# Patient Record
Sex: FEMALE | Race: OTHER | Employment: UNEMPLOYED | URBAN - METROPOLITAN AREA
[De-identification: names, ages, dates, MRNs, and addresses within clinical notes are randomized per-mention and may not be internally consistent; named-entity substitution may affect disease eponyms.]

---

## 2021-08-02 ENCOUNTER — APPOINTMENT (OUTPATIENT)
Dept: GENERAL RADIOLOGY | Age: 51
End: 2021-08-02
Attending: STUDENT IN AN ORGANIZED HEALTH CARE EDUCATION/TRAINING PROGRAM
Payer: COMMERCIAL

## 2021-08-02 ENCOUNTER — HOSPITAL ENCOUNTER (EMERGENCY)
Age: 51
Discharge: HOME | End: 2021-08-02
Attending: STUDENT IN AN ORGANIZED HEALTH CARE EDUCATION/TRAINING PROGRAM
Payer: COMMERCIAL

## 2021-08-02 VITALS
RESPIRATION RATE: 18 BRPM | OXYGEN SATURATION: 97 % | WEIGHT: 158.51 LBS | DIASTOLIC BLOOD PRESSURE: 83 MMHG | BODY MASS INDEX: 28.09 KG/M2 | HEART RATE: 78 BPM | SYSTOLIC BLOOD PRESSURE: 122 MMHG | HEIGHT: 63 IN | TEMPERATURE: 98.6 F

## 2021-08-02 DIAGNOSIS — R07.89 OTHER CHEST PAIN: Primary | ICD-10-CM

## 2021-08-02 LAB
ALBUMIN SERPL-MCNC: 4.3 G/DL (ref 3.5–5)
ALBUMIN/GLOB SERPL: 1.2 {RATIO} (ref 1.1–2.2)
ALP SERPL-CCNC: 86 U/L (ref 45–117)
ALT SERPL-CCNC: 38 U/L (ref 12–78)
ANION GAP SERPL CALC-SCNC: 9 MMOL/L (ref 5–15)
AST SERPL-CCNC: 16 U/L (ref 15–37)
BASOPHILS # BLD: 0 K/UL (ref 0–0.1)
BASOPHILS NFR BLD: 0 % (ref 0–1)
BILIRUB SERPL-MCNC: 0.4 MG/DL (ref 0.2–1)
BUN SERPL-MCNC: 12 MG/DL (ref 6–20)
BUN/CREAT SERPL: 19 (ref 12–20)
CALCIUM SERPL-MCNC: 8.8 MG/DL (ref 8.5–10.1)
CHLORIDE SERPL-SCNC: 102 MMOL/L (ref 97–108)
CO2 SERPL-SCNC: 28 MMOL/L (ref 21–32)
COMMENT, HOLDF: NORMAL
CREAT SERPL-MCNC: 0.62 MG/DL (ref 0.55–1.02)
DIFFERENTIAL METHOD BLD: NORMAL
EOSINOPHIL # BLD: 0.1 K/UL (ref 0–0.4)
EOSINOPHIL NFR BLD: 2 % (ref 0–7)
ERYTHROCYTE [DISTWIDTH] IN BLOOD BY AUTOMATED COUNT: 12 % (ref 11.5–14.5)
GLOBULIN SER CALC-MCNC: 3.5 G/DL (ref 2–4)
GLUCOSE SERPL-MCNC: 102 MG/DL (ref 65–100)
HCT VFR BLD AUTO: 40.4 % (ref 35–47)
HGB BLD-MCNC: 13.9 G/DL (ref 11.5–16)
IMM GRANULOCYTES # BLD AUTO: 0 K/UL (ref 0–0.04)
IMM GRANULOCYTES NFR BLD AUTO: 0 % (ref 0–0.5)
LYMPHOCYTES # BLD: 2.1 K/UL (ref 0.8–3.5)
LYMPHOCYTES NFR BLD: 46 % (ref 12–49)
MCH RBC QN AUTO: 32.1 PG (ref 26–34)
MCHC RBC AUTO-ENTMCNC: 34.4 G/DL (ref 30–36.5)
MCV RBC AUTO: 93.3 FL (ref 80–99)
MONOCYTES # BLD: 0.4 K/UL (ref 0–1)
MONOCYTES NFR BLD: 8 % (ref 5–13)
NEUTS SEG # BLD: 2.1 K/UL (ref 1.8–8)
NEUTS SEG NFR BLD: 45 % (ref 32–75)
NRBC # BLD: 0 K/UL (ref 0–0.01)
NRBC BLD-RTO: 0 PER 100 WBC
PLATELET # BLD AUTO: 251 K/UL (ref 150–400)
PMV BLD AUTO: 10.3 FL (ref 8.9–12.9)
POTASSIUM SERPL-SCNC: 3.8 MMOL/L (ref 3.5–5.1)
PROT SERPL-MCNC: 7.8 G/DL (ref 6.4–8.2)
RBC # BLD AUTO: 4.33 M/UL (ref 3.8–5.2)
SAMPLES BEING HELD,HOLD: NORMAL
SODIUM SERPL-SCNC: 139 MMOL/L (ref 136–145)
TROPONIN I SERPL-MCNC: <0.05 NG/ML
TROPONIN I SERPL-MCNC: <0.05 NG/ML
WBC # BLD AUTO: 4.6 K/UL (ref 3.6–11)

## 2021-08-02 PROCEDURE — 74011250637 HC RX REV CODE- 250/637: Performed by: STUDENT IN AN ORGANIZED HEALTH CARE EDUCATION/TRAINING PROGRAM

## 2021-08-02 PROCEDURE — 99284 EMERGENCY DEPT VISIT MOD MDM: CPT

## 2021-08-02 PROCEDURE — 71046 X-RAY EXAM CHEST 2 VIEWS: CPT

## 2021-08-02 PROCEDURE — 93005 ELECTROCARDIOGRAM TRACING: CPT

## 2021-08-02 PROCEDURE — 36415 COLL VENOUS BLD VENIPUNCTURE: CPT

## 2021-08-02 PROCEDURE — 84484 ASSAY OF TROPONIN QUANT: CPT

## 2021-08-02 PROCEDURE — 80053 COMPREHEN METABOLIC PANEL: CPT

## 2021-08-02 PROCEDURE — 85025 COMPLETE CBC W/AUTO DIFF WBC: CPT

## 2021-08-02 RX ORDER — GUAIFENESIN 100 MG/5ML
324 LIQUID (ML) ORAL
Status: COMPLETED | OUTPATIENT
Start: 2021-08-02 | End: 2021-08-02

## 2021-08-02 RX ORDER — ERGOCALCIFEROL 1.25 MG/1
50000 CAPSULE ORAL
COMMUNITY

## 2021-08-02 RX ORDER — ALBUTEROL SULFATE 90 UG/1
2 AEROSOL, METERED RESPIRATORY (INHALATION)
COMMUNITY

## 2021-08-02 RX ADMIN — ASPIRIN 324 MG: 81 TABLET, CHEWABLE ORAL at 12:56

## 2021-08-02 NOTE — ED TRIAGE NOTES
Pt ambulatory to treatment room, reports chest pain that began about a year ago, then a few weeks ago was woken from sleep, and then today around 11am while she was working out. Pt reports feeling like there is a \"big ball in the center of my chest that radiates to the back. \" Pt has an appt with PCP next week.

## 2021-08-02 NOTE — ED NOTES
Pt resting on stretcher in no obvious distress. Blood draw sent to lab. Awaiting results.  Call bell in reach

## 2021-08-02 NOTE — DISCHARGE INSTRUCTIONS
You presented to the ED with chest pain, full ACS work-up was completed. Your troponins were both within normal limits. You are deemed to have low risk for acute coronary syndrome. However as you have had pain at rest and pain with exertion please follow-up with your primary care doctor as well as cardiology.

## 2021-08-02 NOTE — ED PROVIDER NOTES
Patient is a 64-year-old female presenting to the ED with chest pain after exercise with history of couple previous episodes of chest pain. The history is provided by the patient and the spouse. Chest Pain   This is a recurrent problem. The current episode started 3 to 5 hours ago. The problem has been gradually improving. The problem occurs constantly. The pain is associated with exertion. The pain is present in the substernal region. The pain is mild. The quality of the pain is described as sharp. The pain does not radiate. Pertinent negatives include no diaphoresis, no irregular heartbeat, no lower extremity edema, no nausea, no near-syncope, no palpitations and no shortness of breath. She has tried rest for the symptoms. The treatment provided moderate relief. No risk factors for CADHer past medical history does not include DVT, HTN or CHF. Past Medical History:   Diagnosis Date    Asthma     Mitral valve prolapse        Past Surgical History:   Procedure Laterality Date    HX GYN Left     ovary removal    HX GYN          HX GYN      partial hysterectomy, pt has cervix and one ovary         History reviewed. No pertinent family history.     Social History     Socioeconomic History    Marital status:      Spouse name: Not on file    Number of children: Not on file    Years of education: Not on file    Highest education level: Not on file   Occupational History    Not on file   Tobacco Use    Smoking status: Never Smoker    Smokeless tobacco: Never Used   Substance and Sexual Activity    Alcohol use: Yes     Comment: several drinks per day    Drug use: Never    Sexual activity: Not on file   Other Topics Concern    Not on file   Social History Narrative    Not on file     Social Determinants of Health     Financial Resource Strain:     Difficulty of Paying Living Expenses:    Food Insecurity:     Worried About Running Out of Food in the Last Year:     [address] in the Last Year:    Transportation Needs:     Lack of Transportation (Medical):  Lack of Transportation (Non-Medical):    Physical Activity:     Days of Exercise per Week:     Minutes of Exercise per Session:    Stress:     Feeling of Stress :    Social Connections:     Frequency of Communication with Friends and Family:     Frequency of Social Gatherings with Friends and Family:     Attends [employer] Services:     Active Member of Clubs or Organizations:     Attends Club or Organization Meetings:     Marital Status:    Intimate Partner Violence:     Fear of Current or Ex-Partner:     Emotionally Abused:     Physically Abused:     Sexually Abused: ALLERGIES: Ampicillin    Review of Systems   Constitutional: Negative. Negative for diaphoresis. HENT: Negative. Eyes: Negative. Respiratory: Negative. Negative for shortness of breath. Cardiovascular: Positive for chest pain. Negative for palpitations and near-syncope. Gastrointestinal: Negative. Negative for nausea. Endocrine: Negative. Genitourinary: Negative. Musculoskeletal: Negative. Skin: Negative. Allergic/Immunologic: Negative. Neurological: Negative. Hematological: Negative. Psychiatric/Behavioral: Negative. Vitals:    08/02/21 1400 08/02/21 1430 08/02/21 1500 08/02/21 1530   BP: 127/83 128/83 125/78 122/83   Pulse: 74 75 91 78   Resp: 15 19 17 18   Temp:       SpO2: 99% 99% 98% 97%   Weight:       Height:                Physical Exam  Vitals and nursing note reviewed. Constitutional:       General: She is not in acute distress. HENT:      Head: Normocephalic and atraumatic. Right Ear: External ear normal.      Left Ear: External ear normal.      Nose: Nose normal.      Mouth/Throat:      Mouth: Mucous membranes are moist.      Pharynx: Oropharynx is clear. No posterior oropharyngeal erythema. Eyes:      Extraocular Movements: Extraocular movements intact.       Conjunctiva/sclera: Conjunctivae normal.   Cardiovascular:      Rate and Rhythm: Normal rate. Pulses: Normal pulses. Heart sounds: Normal heart sounds. Pulmonary:      Effort: Pulmonary effort is normal.      Breath sounds: Normal breath sounds. Chest:      Chest wall: No deformity. Abdominal:      General: Abdomen is flat. Tenderness: There is no abdominal tenderness. Musculoskeletal:         General: Normal range of motion. Cervical back: Normal range of motion. Skin:     General: Skin is warm and dry. Capillary Refill: Capillary refill takes less than 2 seconds. Neurological:      General: No focal deficit present. Mental Status: She is alert and oriented to person, place, and time. Psychiatric:         Mood and Affect: Mood normal.         Behavior: Behavior normal.          MDM       LABORATORY RESULTS:  Labs Reviewed   METABOLIC PANEL, COMPREHENSIVE - Abnormal; Notable for the following components:       Result Value    Glucose 102 (*)     All other components within normal limits   SAMPLES BEING HELD   CBC WITH AUTOMATED DIFF   TROPONIN I   TROPONIN I       IMAGING RESULTS:  XR CHEST PA LAT   Final Result   Normal chest.             MEDICATIONS GIVEN:  Medications   aspirin chewable tablet 324 mg (324 mg Oral Given 8/2/21 1256)       Differential diagnosis: ACS, noncardiac chest pain, anxiety, asthma    ED physician interpretation of imaging: Chest x-ray without hemopneumothorax, focal pneumonia. ED physician interpretation of EKG: No STEMI. Rhythm: normal sinus rhythm; and regular . Rate (approx.): 89; EKG documented and interpreted by Padmini Valles MD  ED physician interpretation of laboratory results: Patient's lab work, specifically delta troponin both unmeasurable.     MDM: Patient is a 26-year-old female with history of asthma who has had a few previous episodes of chest pain today chest pain returned with exercise patient found to have unremarkable ACS work-up, doubt ACS at this time, no other signs of significant pathology requiring further emergent medical intervention, patient appropriate for discharge home and close outpatient follow-up with both her primary care doctor and cardiology. Key discharge instructions: You presented to the ED with chest pain, full ACS work-up was completed. Your troponins were both within normal limits. You are deemed to have low risk for acute coronary syndrome. However as you have had pain at rest and pain with exertion please follow-up with your primary care doctor as well as cardiology. The patient has been re-evaluated and feeling much better and are stable for discharge. All available radiology and laboratory results have been reviewed with patient and/or available family. Patient and/or family verbally conveyed their understanding and agreement of the patient's signs, symptoms, diagnosis, treatment and prognosis and additionally agree to follow-up as recommended in the discharge instructions or to return to the Emergency Department should their condition change or worsen prior to their follow-up appointment. All questions have been answered and patient and/or available family express understanding. IMPRESSION:  1. Other chest pain Active       DISPOSITION: Discharged    Eddy Diane MD        Procedures

## 2021-08-02 NOTE — ED NOTES
Pt back to stretcher and comfortably positioned independently. Medicated per order. Awaiting all results. Warm blanket provided for comfort.  Call bell in reach

## 2021-08-03 LAB
ATRIAL RATE: 89 BPM
CALCULATED P AXIS, ECG09: 63 DEGREES
CALCULATED R AXIS, ECG10: -19 DEGREES
CALCULATED T AXIS, ECG11: 50 DEGREES
DIAGNOSIS, 93000: NORMAL
P-R INTERVAL, ECG05: 142 MS
Q-T INTERVAL, ECG07: 372 MS
QRS DURATION, ECG06: 78 MS
QTC CALCULATION (BEZET), ECG08: 452 MS
VENTRICULAR RATE, ECG03: 89 BPM

## 2021-08-20 ENCOUNTER — OFFICE VISIT (OUTPATIENT)
Dept: CARDIOLOGY CLINIC | Age: 51
End: 2021-08-20
Payer: COMMERCIAL

## 2021-08-20 VITALS
SYSTOLIC BLOOD PRESSURE: 142 MMHG | DIASTOLIC BLOOD PRESSURE: 94 MMHG | BODY MASS INDEX: 27.64 KG/M2 | HEART RATE: 86 BPM | HEIGHT: 63 IN | WEIGHT: 156 LBS | OXYGEN SATURATION: 98 %

## 2021-08-20 DIAGNOSIS — I34.1 MVP (MITRAL VALVE PROLAPSE): ICD-10-CM

## 2021-08-20 DIAGNOSIS — R07.9 CHEST PAIN, UNSPECIFIED TYPE: Primary | ICD-10-CM

## 2021-08-20 PROCEDURE — 99203 OFFICE O/P NEW LOW 30 MIN: CPT | Performed by: INTERNAL MEDICINE

## 2021-08-20 PROCEDURE — 93000 ELECTROCARDIOGRAM COMPLETE: CPT | Performed by: INTERNAL MEDICINE

## 2021-08-20 RX ORDER — FAMOTIDINE 20 MG/1
TABLET, FILM COATED ORAL
COMMUNITY
Start: 2021-08-09

## 2021-08-20 RX ORDER — LORAZEPAM 0.5 MG/1
TABLET ORAL
COMMUNITY
Start: 2021-08-09

## 2021-08-20 NOTE — PROGRESS NOTES
Reason to see patient: Chest pain. HPI: Luis Antonio Santoyo is a 49 y.o. female with no significant past medical history is here for evaluation of symptoms of chest pain. Symptoms started few days weeks ago. She had an episode in 2021 when she felt symptom of chest pain anterior chest wall mid epigastric region felt as if there was a ball sitting in her chest and radiating to her back. Symptoms lasted for few hours therefore she came to the ER. ER work-up was normal including lab results as well as EKG. She was released to home once her chest pain improved on its own. She was given aspirin in the ER. She was asked to follow-up with cardiology. She followed up with her primary care physician on 2021. I was personally able to review the records from her primary care physician. She does not smoke tobacco.  No significant family history of CAD. Of note she has previous history of small hiatal hernia diagnosed 2 years ago on endoscopy. EKG today demonstrated normal sinus rhythm, normal axis, normal intervals, normal ST segment. Plan:    1. Chest pain: Symptoms are atypical.  Could be related to hiatal hernia. Rule out cardiac cause by a stress echocardiogram.    2.  Preventive cardiac work-up: Check CAC.  and HDL 90 from .     3. She has history of MVP    4. Phone followup of the test results. She moved from [place]. ATTENTION:   This medical record was transcribed using an electronic medical records/speech recognition system. Although proofread, it may and can contain electronic, spelling and other errors. Corrections may be executed at a later time. Please feel free to contact us for any clarifications as needed.       Past Medical History:   Diagnosis Date    Asthma     Mitral valve prolapse             Past Surgical History:   Procedure Laterality Date    HX GYN Left     ovary removal    HX GYN          HX GYN      partial hysterectomy, pt has cervix and one ovary             History reviewed. No pertinent family history. Social History     Socioeconomic History    Marital status:      Spouse name: Not on file    Number of children: Not on file    Years of education: Not on file    Highest education level: Not on file   Occupational History    Not on file   Tobacco Use    Smoking status: Never Smoker    Smokeless tobacco: Never Used   Substance and Sexual Activity    Alcohol use: Yes     Comment: several drinks per day    Drug use: Never    Sexual activity: Not on file   Other Topics Concern    Not on file   Social History Narrative    Not on file     Social Determinants of Health     Financial Resource Strain:     Difficulty of Paying Living Expenses:    Food Insecurity:     Worried About Running Out of Food in the Last Year:     [address] in the Last Year:    Transportation Needs:     Lack of Transportation (Medical):  Lack of Transportation (Non-Medical):    Physical Activity:     Days of Exercise per Week:     Minutes of Exercise per Session:    Stress:     Feeling of Stress :    Social Connections:     Frequency of Communication with Friends and Family:     Frequency of Social Gatherings with Friends and Family:     Attends [employer] Services:     Active Member of Clubs or Organizations:     Attends Club or Organization Meetings:     Marital Status:    Intimate Partner Violence:     Fear of Current or Ex-Partner:     Emotionally Abused:     Physically Abused:     Sexually Abused: Allergies   Allergen Reactions    Ampicillin Rash            Current Outpatient Medications   Medication Sig Dispense Refill    famotidine (PEPCID) 20 mg tablet       LORazepam (ATIVAN) 0.5 mg tablet       albuterol (Ventolin HFA) 90 mcg/actuation inhaler Take 2 Puffs by inhalation.  ergocalciferol (Vitamin D2) 1,250 mcg (50,000 unit) capsule Take 50,000 Units by mouth every seven (7) days. ROS:  12 point review of systems was performed. All negative except for HPI     Physical Exam:  Visit Vitals  BP (!) 142/94 (BP 1 Location: Left upper arm, BP Patient Position: Sitting, BP Cuff Size: Adult)   Pulse 86   Ht 5' 3\" (1.6 m)   Wt 156 lb (70.8 kg)   SpO2 98%   BMI 27.63 kg/m²       Gen:  Well-developed, well-nourished, in no acute distress  HEENT:  Pink conjunctivae, PERRL, hearing intact to voice, moist mucous membranes  Neck:  Supple, without masses, thyroid non-tender  Resp:  No accessory muscle use, clear breath sounds without wheezes rales or rhonchi  Card:  No murmurs, normal S1, S2 without thrills, bruits or peripheral edema  Abd:  Soft, non-tender, non-distended, normoactive bowel sounds are present, no palpable organomegaly and no detectable hernias  Lymph:  No cervical or inguinal adenopathy  Musc:  No cyanosis or clubbing  Skin:  No rashes or ulcers, skin turgor is good  Neuro:  Cranial nerves are grossly intact, no focal motor weakness, follows commands appropriately  Psych:  Good insight, oriented to person, place and time, alert     Labs:     Lab Results   Component Value Date/Time    WBC 4.6 08/02/2021 11:51 AM    HGB 13.9 08/02/2021 11:51 AM    HCT 40.4 08/02/2021 11:51 AM    PLATELET 996 68/97/4226 11:51 AM    MCV 93.3 08/02/2021 11:51 AM     Lab Results   Component Value Date/Time    Glucose 102 (H) 08/02/2021 11:51 AM    Creatinine 0.62 08/02/2021 11:51 AM      No results found for: CHOL, CHOLPOCT, HDL, LDL, LDLC, LDLCPOC, LDLCEXT, TRIGL, TGLPOCT, CHHD, CHHDX  Lab Results   Component Value Date/Time    ALT (SGPT) 38 08/02/2021 11:51 AM    Alk.  phosphatase 86 08/02/2021 11:51 AM    Bilirubin, total 0.4 08/02/2021 11:51 AM    Albumin 4.3 08/02/2021 11:51 AM    Protein, total 7.8 08/02/2021 11:51 AM    PLATELET 797 72/13/7597 11:51 AM     No results found for: INR, INREXT, PTMR, PTP, PT1, PT2, INREXT   Lab Results   Component Value Date/Time    GFR est non-AA >60 08/02/2021 11:51 AM GFR est AA >60 08/02/2021 11:51 AM    Creatinine 0.62 08/02/2021 11:51 AM    BUN 12 08/02/2021 11:51 AM    Sodium 139 08/02/2021 11:51 AM    Potassium 3.8 08/02/2021 11:51 AM    Chloride 102 08/02/2021 11:51 AM    CO2 28 08/02/2021 11:51 AM     No results found for: PSA, PSA2, PSAR1, [place], PSAR3, [id number], [id number]  No results found for: TSH, TSH2, TSH3, TSHP, TSHELE, TSHEXT, TT3, T3U, T3UP, FRT3, FT3, FT4, FT4P, T4, T4P, FT4T, TT7, TSHEXT   Lab Results   Component Value Date/Time    Glucose 102 (H) 08/02/2021 11:51 AM      Lab Results   Component Value Date/Time    Troponin-I, Qt. <0.05 08/02/2021 02:19 PM      No results found for: BNP, BNPP, BNPPPOC, XBNPT, BNPNT   Lab Results   Component Value Date/Time    Sodium 139 08/02/2021 11:51 AM    Potassium 3.8 08/02/2021 11:51 AM    Chloride 102 08/02/2021 11:51 AM    CO2 28 08/02/2021 11:51 AM    Anion gap 9 08/02/2021 11:51 AM    Glucose 102 (H) 08/02/2021 11:51 AM    BUN 12 08/02/2021 11:51 AM    Creatinine 0.62 08/02/2021 11:51 AM    BUN/Creatinine ratio 19 08/02/2021 11:51 AM    GFR est AA >60 08/02/2021 11:51 AM    GFR est non-AA >60 08/02/2021 11:51 AM    Calcium 8.8 08/02/2021 11:51 AM      Lab Results   Component Value Date/Time    Sodium 139 08/02/2021 11:51 AM    Potassium 3.8 08/02/2021 11:51 AM    Chloride 102 08/02/2021 11:51 AM    CO2 28 08/02/2021 11:51 AM    Anion gap 9 08/02/2021 11:51 AM    Glucose 102 (H) 08/02/2021 11:51 AM    BUN 12 08/02/2021 11:51 AM    Creatinine 0.62 08/02/2021 11:51 AM    BUN/Creatinine ratio 19 08/02/2021 11:51 AM    GFR est AA >60 08/02/2021 11:51 AM    GFR est non-AA >60 08/02/2021 11:51 AM    Calcium 8.8 08/02/2021 11:51 AM    Bilirubin, total 0.4 08/02/2021 11:51 AM    ALT (SGPT) 38 08/02/2021 11:51 AM    Alk.  phosphatase 86 08/02/2021 11:51 AM    Protein, total 7.8 08/02/2021 11:51 AM    Albumin 4.3 08/02/2021 11:51 AM    Globulin 3.5 08/02/2021 11:51 AM    A-G Ratio 1.2 08/02/2021 11:51 AM      No results found for: HBA1C, ZHN9ZTCH, ZHD6YCDC, XVM4ZDKT      No results for input(s): CPK, CKMB, TROIQ in the last 72 hours. No lab exists for component: CKQMB, CPKMB          Luke Bryan MD, [facility]

## 2021-08-20 NOTE — PROGRESS NOTES
All orders entered per verbal orders of Dr. Keith Shaw Echo- Chest pain. CAC- Chest pain. Paperwork given to the pt  Phone followup of the test results.

## 2021-09-01 ENCOUNTER — HOSPITAL ENCOUNTER (OUTPATIENT)
Dept: CT IMAGING | Age: 51
Discharge: HOME | End: 2021-09-01
Attending: INTERNAL MEDICINE
Payer: SELF-PAY

## 2021-09-01 DIAGNOSIS — R07.9 CHEST PAIN, UNSPECIFIED TYPE: ICD-10-CM

## 2021-09-01 PROCEDURE — 75571 CT HRT W/O DYE W/CA TEST: CPT

## 2021-09-07 NOTE — PROGRESS NOTES
CAC is zero. This means there is no significant blockages and the risk of near future heart attack is very very low. Continue healthy lifestyle including exercise and healthy diet. Repeat CAC scan in 3-5 years.

## 2021-09-17 ENCOUNTER — APPOINTMENT (OUTPATIENT)
Dept: CARDIOLOGY CLINIC | Age: 51
End: 2021-09-17

## 2021-09-17 ENCOUNTER — ANCILLARY PROCEDURE (OUTPATIENT)
Dept: CARDIOLOGY CLINIC | Age: 51
End: 2021-09-17
Payer: COMMERCIAL

## 2021-09-17 VITALS
DIASTOLIC BLOOD PRESSURE: 80 MMHG | WEIGHT: 156 LBS | SYSTOLIC BLOOD PRESSURE: 120 MMHG | BODY MASS INDEX: 27.64 KG/M2 | HEIGHT: 63 IN

## 2021-09-17 DIAGNOSIS — R07.9 CHEST PAIN, UNSPECIFIED TYPE: ICD-10-CM

## 2021-09-17 PROCEDURE — 93351 STRESS TTE COMPLETE: CPT | Performed by: INTERNAL MEDICINE

## 2021-09-20 LAB
STRESS ANGINA INDEX: 0
STRESS BASELINE DIAS BP: 80 MMHG
STRESS BASELINE HR: 77 BPM
STRESS BASELINE SYS BP: 120 MMHG
STRESS ESTIMATED WORKLOAD: 13.1 METS
STRESS EXERCISE DUR MIN: NORMAL
STRESS O2 SAT PEAK: 96 %
STRESS PEAK DIAS BP: 90 MMHG
STRESS PEAK SYS BP: 160 MMHG
STRESS PERCENT HR ACHIEVED: 101 %
STRESS POST PEAK HR: 171 BPM
STRESS RATE PRESSURE PRODUCT: NORMAL BPM*MMHG
STRESS ST DEPRESSION: 0 MM
STRESS ST ELEVATION: 0 MM
STRESS TARGET HR: 170 BPM

## 2022-11-19 ENCOUNTER — APPOINTMENT (OUTPATIENT)
Dept: GENERAL RADIOLOGY | Age: 52
End: 2022-11-19
Attending: STUDENT IN AN ORGANIZED HEALTH CARE EDUCATION/TRAINING PROGRAM
Payer: COMMERCIAL

## 2022-11-19 ENCOUNTER — HOSPITAL ENCOUNTER (EMERGENCY)
Age: 52
Discharge: HOME | End: 2022-11-19
Attending: STUDENT IN AN ORGANIZED HEALTH CARE EDUCATION/TRAINING PROGRAM
Payer: COMMERCIAL

## 2022-11-19 VITALS
HEART RATE: 103 BPM | BODY MASS INDEX: 26.56 KG/M2 | DIASTOLIC BLOOD PRESSURE: 73 MMHG | OXYGEN SATURATION: 100 % | HEIGHT: 63 IN | WEIGHT: 149.91 LBS | SYSTOLIC BLOOD PRESSURE: 138 MMHG | RESPIRATION RATE: 18 BRPM | TEMPERATURE: 98.6 F

## 2022-11-19 DIAGNOSIS — J06.9 UPPER RESPIRATORY TRACT INFECTION, UNSPECIFIED TYPE: Primary | ICD-10-CM

## 2022-11-19 LAB
FLUAV AG NPH QL IA: NEGATIVE
FLUBV AG NOSE QL IA: NEGATIVE

## 2022-11-19 PROCEDURE — 99284 EMERGENCY DEPT VISIT MOD MDM: CPT

## 2022-11-19 PROCEDURE — 71045 X-RAY EXAM CHEST 1 VIEW: CPT

## 2022-11-19 PROCEDURE — 87804 INFLUENZA ASSAY W/OPTIC: CPT

## 2022-11-19 RX ORDER — METHYLPREDNISOLONE 4 MG/1
TABLET ORAL
Qty: 1 DOSE PACK | Refills: 0 | Status: SHIPPED | OUTPATIENT
Start: 2022-11-19

## 2022-11-19 RX ORDER — FLUTICASONE PROPIONATE AND SALMETEROL 100; 50 UG/1; UG/1
1 POWDER RESPIRATORY (INHALATION) EVERY 12 HOURS
COMMUNITY

## 2022-11-19 RX ORDER — HYDROCODONE POLISTIREX AND CHLORPHENIRAMINE POLISTIREX 10; 8 MG/5ML; MG/5ML
5 SUSPENSION, EXTENDED RELEASE ORAL
Qty: 70 ML | Refills: 0 | Status: SHIPPED | OUTPATIENT
Start: 2022-11-19 | End: 2022-11-22

## 2022-11-19 NOTE — ED TRIAGE NOTES
Pt presents to ED with c/o four weeks of intermittent cough with dyspnea. Pt states they were given course of antibiotics and steroids. She states she felt better for about one week and then her symptoms have returned. She states she is having difficulty taking a deep breath. Pt states no nausea or vomiting. She states she has had abdominal bloating and tingling in her fingers.

## 2022-11-21 NOTE — ED PROVIDER NOTES
Patient is a 50-year-old female present emergency department for cough, shortness of breath. Patient states that she has had symptoms for the last 4 weeks and given a dose of antibiotics and steroids she felt like she was getting better and that her symptoms returned. She states that it is difficult to take a deep breath denies any fevers, chills nausea or vomiting. Past Medical History:   Diagnosis Date    Asthma     Mitral valve prolapse        Past Surgical History:   Procedure Laterality Date    HX GYN Left     ovary removal    HX GYN          HX GYN      partial hysterectomy, pt has cervix and one ovary         History reviewed. No pertinent family history. Social History     Socioeconomic History    Marital status:      Spouse name: Not on file    Number of children: Not on file    Years of education: Not on file    Highest education level: Not on file   Occupational History    Not on file   Tobacco Use    Smoking status: Never    Smokeless tobacco: Never   Substance and Sexual Activity    Alcohol use: Yes     Comment: several drinks per day    Drug use: Never    Sexual activity: Not on file   Other Topics Concern    Not on file   Social History Narrative    Not on file     Social Determinants of Health     Financial Resource Strain: Not on file   Food Insecurity: Not on file   Transportation Needs: Not on file   Physical Activity: Not on file   Stress: Not on file   Social Connections: Not on file   Intimate Partner Violence: Not on file   Housing Stability: Not on file         ALLERGIES: Ampicillin    Review of Systems   Constitutional:  Positive for fatigue. HENT:  Positive for congestion. Respiratory:  Positive for cough, chest tightness and shortness of breath. All other systems reviewed and are negative.     Vitals:    22 1442 22 1600   BP: (!) 159/87 138/73   Pulse: (!) 103    Resp: 18    Temp: 98.6 °F (37 °C)    SpO2: 100% 100%   Weight: 68 kg (149 lb 14.6 oz)    Height: 5' 3\" (1.6 m)             Physical Exam  Vitals and nursing note reviewed. Constitutional:       Appearance: Normal appearance. HENT:      Head: Normocephalic and atraumatic. Nose: Congestion and rhinorrhea present. Eyes:      Extraocular Movements: Extraocular movements intact. Pupils: Pupils are equal, round, and reactive to light. Cardiovascular:      Rate and Rhythm: Regular rhythm. Tachycardia present. Pulmonary:      Effort: Pulmonary effort is normal.      Breath sounds: Normal breath sounds. Abdominal:      General: Abdomen is flat. Palpations: Abdomen is soft. Musculoskeletal:         General: Normal range of motion. Cervical back: Normal range of motion and neck supple. Skin:     General: Skin is warm and dry. Neurological:      General: No focal deficit present. Mental Status: She is alert and oriented to person, place, and time. Psychiatric:         Mood and Affect: Mood normal.         Behavior: Behavior normal.        MDM  Number of Diagnoses or Management Options  Upper respiratory tract infection, unspecified type  Diagnosis management comments: URI-like illness. 27-year-old female presenting with URI-like illness physical exam reassuring chest x-ray unremarkable flu negative likely viral URI.            Procedures